# Patient Record
Sex: MALE | Race: WHITE
[De-identification: names, ages, dates, MRNs, and addresses within clinical notes are randomized per-mention and may not be internally consistent; named-entity substitution may affect disease eponyms.]

---

## 2021-11-22 ENCOUNTER — HOSPITAL ENCOUNTER (EMERGENCY)
Dept: HOSPITAL 11 - JP.ED | Age: 66
Discharge: HOME | End: 2021-11-22
Payer: COMMERCIAL

## 2021-11-22 DIAGNOSIS — I10: ICD-10-CM

## 2021-11-22 DIAGNOSIS — Z20.822: ICD-10-CM

## 2021-11-22 DIAGNOSIS — Z79.899: ICD-10-CM

## 2021-11-22 DIAGNOSIS — K81.9: Primary | ICD-10-CM

## 2021-11-22 LAB — SARS-COV-2 RNA RESP QL NAA+PROBE: NEGATIVE

## 2021-11-22 PROCEDURE — 81001 URINALYSIS AUTO W/SCOPE: CPT

## 2021-11-22 PROCEDURE — 74176 CT ABD & PELVIS W/O CONTRAST: CPT

## 2021-11-22 PROCEDURE — 99285 EMERGENCY DEPT VISIT HI MDM: CPT

## 2021-11-22 PROCEDURE — 83605 ASSAY OF LACTIC ACID: CPT

## 2021-11-22 PROCEDURE — 36415 COLL VENOUS BLD VENIPUNCTURE: CPT

## 2021-11-22 PROCEDURE — 85025 COMPLETE CBC W/AUTO DIFF WBC: CPT

## 2021-11-22 PROCEDURE — 80053 COMPREHEN METABOLIC PANEL: CPT

## 2021-11-22 PROCEDURE — 0241U: CPT

## 2021-11-22 NOTE — EDM.PDOC
ED HPI GENERAL MEDICAL PROBLEM





- General


Chief Complaint: General


Stated Complaint: FEVER,VOMITING,DIZZY


Time Seen by Provider: 11/22/21 11:17


Source of Information: Reports: Patient, RN Notes Reviewed


History Limitations: Reports: No Limitations





- History of Present Illness


INITIAL COMMENTS - FREE TEXT/NARRATIVE: 





66-year-old gentleman presents emergency department day complaint of nausea 

vomiting weakness, he states couple days last weekend and over the weekend he 

had pretty significant nausea and vomiting and now he feels just weak and 

rundown excessively thirsty.  He has been vaccinated with the booster no fevers 

no exposures that he is aware of the Covid 19


  ** Abdomen


Pain Score (Numeric/FACES): 4





- Related Data


                                    Allergies











Allergy/AdvReac Type Severity Reaction Status Date / Time


 


No Known Allergies Allergy   Verified 11/22/21 10:25











Home Meds: 


                                    Home Meds





Cyclobenzaprine [Flexeril] 10 mg PO TID PRN 11/20/19 [History]


amLODIPine Besylate [Norvasc] 10 mg PO DAILY 11/20/19 [History]


lisinopriL [Lisinopril] 10 mg PO DAILY 11/20/19 [History]











Past Medical History


Cardiovascular History: Reports: Hypertension


Musculoskeletal History: Reports: Fracture


Oncologic (Cancer) History: Reports: Other (See Below)


Other Oncologic History: Kidney CA





- Past Surgical History


GI Surgical History: Reports: Hernia, Inguinal


Other Male  Surgeries/Procedures: partial nephrectomy d/t cancer


Musculoskeletal Surgical History: Reports: Other (See Below)


Other Musculoskeletal Surgeries/Procedures:: toe surgery





Social & Family History





- Tobacco Use


Tobacco Use Status *Q: Never Tobacco User





- Caffeine Use


Caffeine Use: Reports: Coffee





- Alcohol Use


Days Per Week of Alcohol Use: 7


Number of Drinks Per Day: 2


Total Drinks Per Week: 14





- Recreational Drug Use


Recreational Drug Use: No





ED ROS GENERAL





- Review of Systems


Review Of Systems: See Below


Constitutional: Reports: Fever, Weakness, Fatigue


HEENT: Reports: No Symptoms


Respiratory: Reports: No Symptoms


Cardiovascular: Reports: No Symptoms


GI/Abdominal: Reports: Nausea, Vomiting.  Denies: Abdominal Pain





ED EXAM, GENERAL





- Physical Exam


Exam: See Below


Exam Limited By: No Limitations


General Appearance: Alert, WD/WN, No Apparent Distress


Respiratory/Chest: No Respiratory Distress, Lungs Clear, Normal Breath Sounds, 

No Accessory Muscle Use, Chest Non-Tender


Cardiovascular: Regular Rate, Rhythm, No Murmur


GI/Abdominal: Soft, Tender (RUQ)





Course





- Vital Signs


Last Recorded V/S: 


                                Last Vital Signs











Temp  97.9 F   11/22/21 10:24


 


Pulse  104 H  11/22/21 10:24


 


Resp  16   11/22/21 10:24


 


BP  138/78   11/22/21 10:24


 


Pulse Ox  93 L  11/22/21 10:24














- Orders/Labs/Meds


Orders: 


                               Active Orders 24 hr











 Category Date Time Status


 


 Peripheral IV Care [RC] .AS DIRECTED Care  11/22/21 11:20 Active


 


 UA W/MICROSCOPIC [URIN] Urgent Lab  11/22/21 13:25 Results


 


 Lactated Ringers [Ringers, Lactated] 1,000 ml Med  11/22/21 11:30 Active





 IV ASDIRECTED   


 


 Sodium Chloride 0.9% [Saline Flush] Med  11/22/21 11:19 Active





 10 ml FLUSH ASDIRECTED PRN   


 


 Isolation [COMM] Stat Oth  11/22/21 09:28 Ordered


 


 Peripheral IV Insertion Adult [OM.PC] Urgent Oth  11/22/21 11:19 Ordered








                                Medication Orders





Lactated Ringer's (Ringers, Lactated)  1,000 mls @ 999 mls/hr IV ASDIRECTED JEREL


   Last Admin: 11/22/21 11:44  Dose: 999 mls/hr


   Documented by: [id number]


Sodium Chloride (Sodium Chloride 0.9% 10 Ml Syringe)  10 ml FLUSH ASDIRECTED PRN


   PRN Reason: Keep Vein Open


   Last Admin: 11/22/21 11:44  Dose: 10 ml


   Documented by: [id number]








Labs: 


                                Laboratory Tests











  11/22/21 11/22/21 11/22/21 Range/Units





  09:28 11:43 11:43 


 


WBC   14.0 H   (4.5-11.0)  K/uL


 


RBC   5.53   (4.30-5.90)  M/uL


 


Hgb   16.0 H   (12.0-15.0)  g/dL


 


Hct   46.1   (40.0-54.0)  %


 


MCV   83   (80-98)  fL


 


MCH   29   (27-31)  pg


 


MCHC   35   (32-36)  %


 


Plt Count   278   (150-400)  K/uL


 


Add Manual Diff   Yes   


 


Neutrophils % (Manual)   77 H   (36-66)  %


 


Lymphocytes % (Manual)   9 L   (24-44)  %


 


Monocytes % (Manual)   14 H   (2-6)  %


 


Sodium    137 L  (140-148)  mmol/L


 


Potassium    3.6  (3.6-5.2)  mmol/L


 


Chloride    98 L  (100-108)  mmol/L


 


Carbon Dioxide    27  (21-32)  mmol/L


 


Anion Gap    15.6 H  (5.0-14.0)  mmol/L


 


BUN    54 H  (7-18)  mg/dL


 


Creatinine    2.0 H  (0.8-1.3)  mg/dL


 


Est Cr Clr Drug Dosing    40.96  mL/min


 


Estimated GFR (MDRD)    34 L  (>60)  


 


Glucose    121 H  ()  mg/dL


 


Lactic Acid     (0.4-2.0)  mmol/L


 


Calcium    8.7  (8.5-10.1)  mg/dL


 


Total Bilirubin    1.4 H  (0.2-1.0)  mg/dL


 


AST    82 H  (15-37)  U/L


 


ALT    190 H  (12-78)  U/L


 


Alkaline Phosphatase    582 H  ()  U/L


 


Total Protein    6.9  (6.4-8.2)  g/dL


 


Albumin    2.3 L  (3.4-5.0)  g/dL


 


Globulin    4.6 H  (2.3-3.5)  g/dL


 


Albumin/Globulin Ratio    0.5 L  (1.2-2.2)  


 


Urine Color     (YELLOW)  


 


Urine Appearance     (CLEAR)  


 


Urine pH     (5.0-8.0)  


 


Ur Specific Gravity     (1.008-1.030)  


 


Urine Protein     (NEGATIVE)  mg/dL


 


Urine Glucose (UA)     (NEGATIVE)  mg/dL


 


Urine Ketones     (NEGATIVE)  mg/dL


 


Urine Occult Blood     (NEGATIVE)  


 


Urine Nitrite     (NEGATIVE)  


 


Urine Bilirubin     (NEGATIVE)  


 


Urine Urobilinogen     (0.2-1.0)  EU/dL


 


Ur Leukocyte Esterase     (NEGATIVE)  


 


Urine RBC     (0-5)  


 


Urine WBC     (0-5)  


 


Ur Epithelial Cells     


 


Amorphous Sediment     


 


Urine Bacteria     


 


Urine Mucus     


 


Influenza Type A RNA  Negative    (NEGATIVE)  


 


RSV RNA (INAAT)  Negative    (NEGATIVE)  


 


Influenza Type B RNA  Negative    (NEGATIVE)  


 


SARS-CoV-2 RNA (ARTIS)  Negative    (NEGATIVE)  














  11/22/21 11/22/21 Range/Units





  11:43 13:25 


 


WBC    (4.5-11.0)  K/uL


 


RBC    (4.30-5.90)  M/uL


 


Hgb    (12.0-15.0)  g/dL


 


Hct    (40.0-54.0)  %


 


MCV    (80-98)  fL


 


MCH    (27-31)  pg


 


MCHC    (32-36)  %


 


Plt Count    (150-400)  K/uL


 


Add Manual Diff    


 


Neutrophils % (Manual)    (36-66)  %


 


Lymphocytes % (Manual)    (24-44)  %


 


Monocytes % (Manual)    (2-6)  %


 


Sodium    (140-148)  mmol/L


 


Potassium    (3.6-5.2)  mmol/L


 


Chloride    (100-108)  mmol/L


 


Carbon Dioxide    (21-32)  mmol/L


 


Anion Gap    (5.0-14.0)  mmol/L


 


BUN    (7-18)  mg/dL


 


Creatinine    (0.8-1.3)  mg/dL


 


Est Cr Clr Drug Dosing    mL/min


 


Estimated GFR (MDRD)    (>60)  


 


Glucose    ()  mg/dL


 


Lactic Acid  0.8   (0.4-2.0)  mmol/L


 


Calcium    (8.5-10.1)  mg/dL


 


Total Bilirubin    (0.2-1.0)  mg/dL


 


AST    (15-37)  U/L


 


ALT    (12-78)  U/L


 


Alkaline Phosphatase    ()  U/L


 


Total Protein    (6.4-8.2)  g/dL


 


Albumin    (3.4-5.0)  g/dL


 


Globulin    (2.3-3.5)  g/dL


 


Albumin/Globulin Ratio    (1.2-2.2)  


 


Urine Color   Yellow  (YELLOW)  


 


Urine Appearance   Slightly cloudy A  (CLEAR)  


 


Urine pH   5.5  (5.0-8.0)  


 


Ur Specific Gravity   1.020  (1.008-1.030)  


 


Urine Protein   100 H  (NEGATIVE)  mg/dL


 


Urine Glucose (UA)   Negative  (NEGATIVE)  mg/dL


 


Urine Ketones   Negative  (NEGATIVE)  mg/dL


 


Urine Occult Blood   Moderate H  (NEGATIVE)  


 


Urine Nitrite   Negative  (NEGATIVE)  


 


Urine Bilirubin   Small  (NEGATIVE)  


 


Urine Urobilinogen   1.0  (0.2-1.0)  EU/dL


 


Ur Leukocyte Esterase   Negative  (NEGATIVE)  


 


Urine RBC   5-10 H  (0-5)  


 


Urine WBC   0-5  (0-5)  


 


Ur Epithelial Cells   Not seen  


 


Amorphous Sediment   Many  


 


Urine Bacteria   Many  


 


Urine Mucus   Not seen  


 


Influenza Type A RNA    (NEGATIVE)  


 


RSV RNA (INAAT)    (NEGATIVE)  


 


Influenza Type B RNA    (NEGATIVE)  


 


SARS-CoV-2 RNA (ARTIS)    (NEGATIVE)  











Meds: 


Medications











Generic Name Dose Route Start Last Admin





  Trade Name Freq  PRN Reason Stop Dose Admin


 


Lactated Ringer's  1,000 mls @ 999 mls/hr  11/22/21 11:30  11/22/21 11:44





  Ringers, Lactated  IV   999 mls/hr





  ASDIRECTED JEREL   Administration


 


Sodium Chloride  10 ml  11/22/21 11:19  11/22/21 11:44





  Sodium Chloride 0.9% 10 Ml Syringe  FLUSH   10 ml





  ASDIRECTED PRN   Administration





  Keep Vein Open  














Discontinued Medications














Generic Name Dose Route Start Last Admin





  Trade Name Freq  PRN Reason Stop Dose Admin


 


Lactated Ringer's  1,000 mls @ 999 mls/hr  11/22/21 13:29  11/22/21 15:00





  Ringers, Lactated  IV  11/22/21 14:29  999 mls/hr





  BOLUS ONE   Administration














Departure





- Departure


Time of Disposition: 15:55


Disposition: Home, Self-Care 01


Condition: Fair


Clinical Impression: 


 Cholecystitis








- Discharge Information


Instructions:  Cholecystitis, Easy-to-Read


Referrals: 


PCP,None [Primary Care Provider] - 


Forms:  ED Department Discharge


Additional Instructions: 


Continue with your regular medications, use hydrocodone as needed for pain 

control, the surgery center will contact you for an appointment time call or 

return to the emergency department worsening of symptoms





Sepsis Event Note (ED)





- Evaluation


Sepsis Screening Result: No Definite Risk





- Focused Exam


Vital Signs: 


                                   Vital Signs











  Temp Pulse Resp BP Pulse Ox


 


 11/22/21 10:24  97.9 F  104 H  16  138/78  93 L














- My Orders


Last 24 Hours: 


My Active Orders





11/22/21 09:28


Isolation [COMM] Stat 





11/22/21 11:19


Sodium Chloride 0.9% [Saline Flush]   10 ml FLUSH ASDIRECTED PRN 


Peripheral IV Insertion Adult [OM.PC] Urgent 





11/22/21 11:20


Peripheral IV Care [RC] .AS DIRECTED 





11/22/21 11:30


Lactated Ringers [Ringers, Lactated] 1,000 ml IV ASDIRECTED 





11/22/21 13:25


UA W/MICROSCOPIC [URIN] Urgent 














- Assessment/Plan


Last 24 Hours: 


My Active Orders





11/22/21 09:28


Isolation [COMM] Stat 





11/22/21 11:19


Sodium Chloride 0.9% [Saline Flush]   10 ml FLUSH ASDIRECTED PRN 


Peripheral IV Insertion Adult [OM.PC] Urgent 





11/22/21 11:20


Peripheral IV Care [RC] .AS DIRECTED 





11/22/21 11:30


Lactated Ringers [Ringers, Lactated] 1,000 ml IV ASDIRECTED 





11/22/21 13:25


UA W/MICROSCOPIC [URIN] Urgent 











Plan: 





Assessment





Acuity = acute





Site and laterality = cholecystitis





Etiology  = unknown





Manifestations = abdominal pain





Location of injury =  Home





Lab values = WBC elevated 14.0 consistent leukocytosis, sodium low at 137 

consistent hyponatremia creatinine elevated 2.0 acute renal failure stage G3 

lactic acid normal 0.8, total bilirubin elevated 1.4 consistent 

hyperbilirubinemia AST at 82  consistent elevated liver enzymes alk phos 

elevated 582 urinalysis reveals 5-10 RBCs consistent with hematuria CT scan 

describes acute cholecystitis COVID-19 was negative, influenza a and B both 

negative RSV negative





Plan


Called and discussed case Dr. Louise surgeon on-call at 1530 kindly agreed 

to come and evaluate the patient in the emergency department, felt that he is a 

candidate for cholecystectomy however it does not need to be done immediately 

and they will try and coordinate surgery this week, the meantime prescription 

for hydrocodone 5/325 1 tab p.o. 3 times daily as needed total #10 provided for 

additional pain control as needed surgery center will contact him for an 

appointment time

















 This note was dictated using dragon voice recognition software please call with

 any questions on syntax or grammar.

## 2021-11-22 NOTE — CT
Abdomen Pelvis wo Cont

 

CLINICAL HISTORY: Right upper quadrant pain 

 

COMPARISON: None.

 

TECHNIQUE: Axial tomographic images are obtained from the dome of the diaphragm

to the pubic symphysis without IV contrast enhancement. The dosage reduction and

iterative reconstruction techniques employed.

 

FINDINGS: The lung bases show no mass or infiltrate. The liver is free of mass

or biliary dilatation. The gallbladder is distended and moderately thickened.

There is some wall calcification. There is moderate stranding in the surrounding

pericholecystic fat. There may be some fluid in the gallbladder fossa there are

some small stones in the dependent portion the gallbladder.. The spleen has a

normal size and shape. The pancreas shows no mass or inflammatory change. The

adrenal glands appear normal bilaterally. There are postsurgical changes in the

left kidney. There is some mild generalized pelvocaliectasis some of this may be

related to parapelvic cyst. There are scattered low-attenuation areas in the

lower poles of both kidneys which are likely cysts. The ureters have a normal

course and caliber. The bladder has a normal contour. The aorta has a normal

contour. There is no suspicious retroperitoneal adenopathy.

The small intestinal configuration is nonacute. There is gas and feces

throughout the colon. There is some diverticulosis without evidence of

diverticulitis. The appendix is been removed

 

 

IMPRESSION: Moderate gallbladder distention wall thickening and pericholecystic

inflammatory changes consistent with acute cholecystitis

 

There are multiple stones in the dependent portion of the gallbladder. There is

no biliary dilatation.

 

Multiple low-attenuation areas in the left kidney and postsurgical changes. This

should be correlated with prior studies. Ultrasound is also a consideration.

## 2021-11-22 NOTE — CONS
DATE OF SERVICE:  11/22/2021

 

REFERRING PHYSICIAN:

 

CONSULTING PHYSICIAN:  Олег Louise MD

 

REASON FOR CONSULTATION:  Right upper quadrant abdominal pain.

 

HISTORY OF PRESENT ILLNESS:  A pleasant 66-year-old male who had an acute onset of right

upper quadrant abdominal pain associated with some nausea.  This is a recurrent problem for

him.  May worsen by eating greasy or fatty foods.

 

PAST MEDICAL HISTORY:

1. Partial left nephrectomy secondary to malignancy.

2. Umbilical hernia repair.

 

SOCIAL HISTORY:  Not a smoker.

 

FAMILY HISTORY:  Noncontributory.

 

REVIEW OF SYSTEMS:  GENERAL:  Appropriate for condition.

HEENT:  No symptoms.

CARDIOVASCULAR:  No chest pain.

RESPIRATORY:  No shortness of breath.

GASTROINTESTINAL:  As above.

GENITOURINARY:  No dysuria.

NEUROLOGICAL:  No symptoms.

PSYCHIATRIC:  No anxiety.

 

The remainder review of systems reviewed and is negative.

 

PHYSICAL EXAMINATION:

VITAL SIGNS:  Stable.

HEENT:  Pupils are equal.

NECK:  Supple.

LUNGS:  Clear.

CARDIOVASCULAR:  Regular rhythm and rate.

RESPIRATORY:  Lungs are clear to consultation bilaterally.

ABDOMEN:  Pain with palpation right upper quadrant.

EXTREMITIES:  Full range of motion.

NEUROLOGIC:  Alert and oriented x3.

PSYCHIATRIC:  No gross depression.

 

IMAGING:  I did review a CT scan which shows cholelithiasis, cholecystitis.

 

ASSESSMENT/PLAN:  The patient will be scheduled for laparoscopic cholecystectomy with

possible open and bilateral TAP/rectus sheath blocks.  We discussed risks, benefits,

alternatives, and limitations including, but not limited to infection, bleeding,

perforation, false positives and false negatives were explained to the patient and they

wished to proceed.  We also discussed cystic duct leaks, common bile duct injuries, the

possibility of open surgery and other risks not listed here.  Patient understands these

risks and wishes to proceed.

 

 

 

 

Олег Louise MD

DD:  11/22/2021 15:27:26

DT:  11/22/2021 16:43:12

Job #:  [id number]

## 2021-11-26 ENCOUNTER — HOSPITAL ENCOUNTER (INPATIENT)
Dept: HOSPITAL 11 - JP.SDS | Age: 66
LOS: 9 days | Discharge: HOME | DRG: 263 | End: 2021-12-05
Attending: SURGERY | Admitting: SURGERY
Payer: COMMERCIAL

## 2021-11-26 DIAGNOSIS — Z79.899: ICD-10-CM

## 2021-11-26 DIAGNOSIS — K82.A1: ICD-10-CM

## 2021-11-26 DIAGNOSIS — Z88.5: ICD-10-CM

## 2021-11-26 DIAGNOSIS — K80.12: Primary | ICD-10-CM

## 2021-11-26 DIAGNOSIS — K65.9: ICD-10-CM

## 2021-11-26 DIAGNOSIS — Z85.528: ICD-10-CM

## 2021-11-26 DIAGNOSIS — K91.872: ICD-10-CM

## 2021-11-26 DIAGNOSIS — G47.33: ICD-10-CM

## 2021-11-26 DIAGNOSIS — Z87.891: ICD-10-CM

## 2021-11-26 DIAGNOSIS — J44.9: ICD-10-CM

## 2021-11-26 DIAGNOSIS — I10: ICD-10-CM

## 2021-11-26 PROCEDURE — 0FT44ZZ RESECTION OF GALLBLADDER, PERCUTANEOUS ENDOSCOPIC APPROACH: ICD-10-PCS | Performed by: SURGERY

## 2021-11-26 PROCEDURE — P9047 ALBUMIN (HUMAN), 25%, 50ML: HCPCS

## 2021-11-26 RX ADMIN — HYDROCODONE BITARTRATE AND ACETAMINOPHEN PRN TAB: 5; 325 TABLET ORAL at 19:19

## 2021-11-26 RX ADMIN — TAZOBACTAM SODIUM AND PIPERACILLIN SODIUM SCH MLS/HR: 375; 3 INJECTION, SOLUTION INTRAVENOUS at 14:41

## 2021-11-26 RX ADMIN — FENTANYL CITRATE PRN MCG: 50 INJECTION, SOLUTION INTRAMUSCULAR; INTRAVENOUS at 13:45

## 2021-11-26 RX ADMIN — HYDROCODONE BITARTRATE AND ACETAMINOPHEN PRN TAB: 5; 325 TABLET ORAL at 14:42

## 2021-11-26 RX ADMIN — TAZOBACTAM SODIUM AND PIPERACILLIN SODIUM SCH MLS/HR: 375; 3 INJECTION, SOLUTION INTRAVENOUS at 20:09

## 2021-11-27 RX ADMIN — TAZOBACTAM SODIUM AND PIPERACILLIN SODIUM SCH MLS/HR: 375; 3 INJECTION, SOLUTION INTRAVENOUS at 08:54

## 2021-11-27 RX ADMIN — TAZOBACTAM SODIUM AND PIPERACILLIN SODIUM SCH MLS/HR: 375; 3 INJECTION, SOLUTION INTRAVENOUS at 02:49

## 2021-11-27 RX ADMIN — TAZOBACTAM SODIUM AND PIPERACILLIN SODIUM SCH MLS/HR: 375; 3 INJECTION, SOLUTION INTRAVENOUS at 20:16

## 2021-11-27 RX ADMIN — HYDROCODONE BITARTRATE AND ACETAMINOPHEN PRN TAB: 5; 325 TABLET ORAL at 00:15

## 2021-11-27 RX ADMIN — HYDROCODONE BITARTRATE AND ACETAMINOPHEN PRN TAB: 5; 325 TABLET ORAL at 10:33

## 2021-11-27 RX ADMIN — HYDROCODONE BITARTRATE AND ACETAMINOPHEN PRN TAB: 5; 325 TABLET ORAL at 05:56

## 2021-11-27 RX ADMIN — TAZOBACTAM SODIUM AND PIPERACILLIN SODIUM SCH MLS/HR: 375; 3 INJECTION, SOLUTION INTRAVENOUS at 14:16

## 2021-11-28 RX ADMIN — TAZOBACTAM SODIUM AND PIPERACILLIN SODIUM SCH MLS/HR: 375; 3 INJECTION, SOLUTION INTRAVENOUS at 19:59

## 2021-11-28 RX ADMIN — HYDROCODONE BITARTRATE AND ACETAMINOPHEN PRN TAB: 5; 325 TABLET ORAL at 12:55

## 2021-11-28 RX ADMIN — HYDROCODONE BITARTRATE AND ACETAMINOPHEN PRN TAB: 5; 325 TABLET ORAL at 20:07

## 2021-11-28 RX ADMIN — TAZOBACTAM SODIUM AND PIPERACILLIN SODIUM SCH MLS/HR: 375; 3 INJECTION, SOLUTION INTRAVENOUS at 07:41

## 2021-11-28 RX ADMIN — TAZOBACTAM SODIUM AND PIPERACILLIN SODIUM SCH MLS/HR: 375; 3 INJECTION, SOLUTION INTRAVENOUS at 02:44

## 2021-11-28 RX ADMIN — TAZOBACTAM SODIUM AND PIPERACILLIN SODIUM SCH MLS/HR: 375; 3 INJECTION, SOLUTION INTRAVENOUS at 15:59

## 2021-11-29 PROCEDURE — 0W9G3ZZ DRAINAGE OF PERITONEAL CAVITY, PERCUTANEOUS APPROACH: ICD-10-PCS | Performed by: SURGERY

## 2021-11-29 RX ADMIN — HYDROCODONE BITARTRATE AND ACETAMINOPHEN PRN TAB: 5; 325 TABLET ORAL at 15:23

## 2021-11-29 RX ADMIN — HYDROCODONE BITARTRATE AND ACETAMINOPHEN PRN TAB: 5; 325 TABLET ORAL at 20:16

## 2021-11-29 RX ADMIN — FENTANYL CITRATE PRN MCG: 50 INJECTION, SOLUTION INTRAMUSCULAR; INTRAVENOUS at 08:24

## 2021-11-29 RX ADMIN — SODIUM CHLORIDE SCH MLS/HR: 9 INJECTION, SOLUTION INTRAVENOUS at 13:10

## 2021-11-29 RX ADMIN — HYDROCODONE BITARTRATE AND ACETAMINOPHEN PRN TAB: 5; 325 TABLET ORAL at 00:15

## 2021-11-29 RX ADMIN — HYDROCODONE BITARTRATE AND ACETAMINOPHEN PRN TAB: 5; 325 TABLET ORAL at 04:41

## 2021-11-29 NOTE — CRLCT
For Patients:  As a result of the 21st Century Cures Act, medical imaging 

exams and procedure reports are released immediately into your electronic 

medical record.  You may view this report before your referring provider.  

If you have questions, please contact your health care provider.



Indication:



Status post laparoscopic cholecystectomy, increased abdominal pain and 

heart rate



Technique:



Volumetric multidetector CT images of the abdomen and pelvis were obtained 

after the administration of intravenous contrast.



100 cc Isovue-300 low osmolar intravenous contrast



Comparison:



CT abdomen and pelvis November 22, 2021



Findings:



There are right greater than left basilar effusions with adjacent 

compressive atelectasis.



The liver demonstrates a moderately heterogeneous appearance of the 

anteroinferior right liver which may represent minimal edema and/or hepatic 

parenchymal inflammation.



The portal vein is patent.



The gallbladder is surgically absent with demonstration of a large air and 

fluid collection within the gallbladder fossa with a trace amount of 

perihepatic fluid. There is fluid tracking along the right pericolic 

gutter. An underlying bile leak is difficult to exclude. There is no 

evidence of rim enhancement.



There is no significant common biliary ductal dilatation or abrupt cut off.



The spleen is normal in enhancement and size.



There is mild thickening of the gastric antrum which can be seen in the 

setting of chronic gastritis changes. Small duodenal diverticula are noted.



The pancreas is normal in enhancement without significant atrophy.



The adrenal glands are unremarkable.



The kidneys demonstrate simple cystic changes bilaterally.



There is moderate stool seen throughout the colon with distal colonic 

diverticulosis without evidence of diverticulitis.



There is a mildly prominent appendix in the right lower quadrant which 

measures up to 1.1 centimeters with suggestion of periappendiceal 

inflammatory changes.



There is no significant mesenteric, retroperitoneal, or pelvic sidewall 

lymph nodes.



The aorta is nonaneurysmal.  There is no significant atherosclerotic 

disease appreciated.



The solid pelvic viscera are grossly unremarkable.



There is no free fluid or free air.



There is minimal subcutaneous emphysema seen within the anterior abdominal 

wall soft tissues along the right lower quadrant tracking into the central 

pelvis and partially visualized base of the penis.



The lumbar vertebral body heights are grossly maintained with moderate 

multilevel degenerative disc disease.



Impression:



Interval cholecystectomy with demonstration of a large air and fluid 

collection within the gallbladder fossa which may represent postoperative 

changes, a bile leak is difficult to exclude. There is no rim enhancement. 

Correlate with history of clinical symptoms and follow-up with 

hepatobiliary imaging exam if there remains persisting clinical concern. 



There is moderate inflammatory change and ill-defined hypo enhancement 

along the gallbladder fossa within the liver which may represent underlying 

hepatic parenchymal injury. 



Incidental note is made of a moderately prominent appendix in the right 

lower quadrant pain with suggestion of trace periappendiceal fluid 

measuring up to 1.0 centimeters. Correlate with right lower quadrant 

tenderness if there remains per persistent clinical concern as this finding 

may be equivocal and incidental with minimal inflammatory changes from the 

prior surgery tracking along the pericolic gutter. 



Minimal right basilar pleural effusion with adjacent compressive 

atelectasis.



Please note that all CT scans at this facility use dose modulation, 

iterative reconstruction, and/or weight-based dosing when appropriate to 

reduce radiation dose to as low as reasonably achievable.



Dictated by Leobardo Nieves MD @ 11/29/2021 6:18:14 AM



(Electronically Signed)

## 2021-11-29 NOTE — OR
DATE OF PROCEDURE:  11/29/2021

 

SURGEON:  Олег Louise MD

 

PROCEDURE:  Drainage of seroma in liver bed causing peritonitis (37108).

 

COMPLICATIONS:  None.

 

ASSISTANT:  None.

 

ANESTHESIA:  General.

 

RISKS:  Risks, benefits, alternatives, and limitations including, but not limited to

infection, bleeding, perforation, false positives, false negatives were explained to the

patient and he wished to proceed.

 

PROCEDURE IN DETAIL:  The patient was placed in supine position.  The previous incisions

were utilized.  A Veress needle was used to enter the abdomen without abnormality.  A drop

test performed without abnormality.  Abdomen was subsequently insufflated.

 

In the proximity of the liver bed and slightly on the right gutter, a fluid collection was

noted.  This was tan in color, however, there was a __________ would be nondiagnostic as

that oxidizes brown.  This fluid was then cultured.  It was irrigated with 1 L of irrigation

including an additional 500 mL of meropenem.  The Santana-Casarez drain was in the middle of

the fluid collection but was replaced as this did not have any output significantly at the

completion of postoperative phase.  The remainder of the liver fossa and gallbladder area

was inspected and no abnormalities were noted.  The pelvis was inspected for fluid and none

was noted.  The wounds were closed with 3-0 Vicryl and 4-0 Vicryl in interrupted running

fashion.  The peritonitis was noted to be moderate.  No gross stool.  No abnormal bleeding.

The patient tolerated the procedure well.

 

 

 

 

Олег Louise MD

DD:  11/29/2021 11:29:22

DT:  11/29/2021 13:03:23

Job #:  [id number]

## 2021-11-30 RX ADMIN — SODIUM CHLORIDE SCH MLS/HR: 9 INJECTION, SOLUTION INTRAVENOUS at 14:29

## 2021-11-30 RX ADMIN — HYDROCODONE BITARTRATE AND ACETAMINOPHEN PRN TAB: 5; 325 TABLET ORAL at 16:06

## 2021-11-30 RX ADMIN — HYDROCODONE BITARTRATE AND ACETAMINOPHEN PRN TAB: 5; 325 TABLET ORAL at 04:54

## 2021-11-30 RX ADMIN — HYDROCODONE BITARTRATE AND ACETAMINOPHEN PRN TAB: 5; 325 TABLET ORAL at 21:39

## 2021-11-30 RX ADMIN — SODIUM CHLORIDE SCH MLS/HR: 9 INJECTION, SOLUTION INTRAVENOUS at 00:23

## 2021-11-30 NOTE — PCM.CONS
H&P History of Present Illness





- General


Date of Service: 11/30/21


Admit Problem/Dx: 


                           Admission Diagnosis/Problem





Admission Diagnosis/Problem      Cholecystitis








Source of Information: Patient, Provider


History Limitations: Reports: No Limitations





- History of Present Illness


Initial Comments - Free Text/Narative: 





HPI: Seth was admitted 11/26 for a laparoscopic cholecystectomy.  During the 

procedure he was noted to have a necrotic and abscessed gallbladder.  This was 

carefully removed.  Over the next couple of days he clinically appeared to be 

improving but did have intermittent low-grade fevers as well as a rising white 

blood cell count.  He went back to the operating room yesterday for washout 

after a CT scan showed a possible bile leak and fluid collection in the gallbl

adder fossa.  The exploratory laparotomy revealed a probable seroma which was 

washed out.  Patient has been on broad-spectrum antibiotics and these were 

adjusted yesterday after the surgery.  Despite this his white blood cell count 

continues to climb and is now more than 30,000.  I was asked to see him today 

regarding his elevated white count and to help assess for evidence of additional

infection.  The patient reports that he is feeling better each day.  He noted a 

significant improvement in his abdominal pain after surgery yesterday.  He has 

not had a fever in more than 24 hours.  No complaints of headache, nausea or 

shortness of breath.  He has been up and walking around.  Tolerating his diet 

well and overall feels fairly well.


  ** Abdomen


Pain Score (Numeric/FACES): 7





- Related Data


Allergies/Adverse Reactions: 


                                    Allergies











Allergy/AdvReac Type Severity Reaction Status Date / Time


 


No Known Allergies Allergy   Verified 11/26/21 06:17











Home Medications: 


                                    Home Meds





Cyclobenzaprine [Flexeril] 10 mg PO TID PRN 11/20/19 [History]


amLODIPine Besylate [Norvasc] 10 mg PO DAILY 11/20/19 [History]


lisinopriL [Lisinopril] 10 mg PO DAILY 11/20/19 [History]


Ibuprofen 600 mg PO DAILY 11/23/21 [History]


diphenhydrAMINE HCL [Diphenhydramine HCl] 12.5 mg PO BEDTIME PRN 11/23/21 

[History]


Acetaminophen/HYDROcodone [HYDROcodone-Acetaminophen 5-325 MG *] 1 tab PO Q4H 

PRN 11/26/21 [History]











Past Medical History


HEENT History: Reports: Cataract


Cardiovascular History: Reports: Hypertension


Respiratory History: Reports: Sleep Apnea


Musculoskeletal History: Reports: Fracture


Neurological History: Reports: Other (See Below)


Other Neuro History: degenerative disc disease


Oncologic (Cancer) History: Reports: Other (See Below)


Other Oncologic History: Kidney CA





- Infectious Disease History


Infectious Disease History: Reports: Measles





- Past Surgical History


HEENT Surgical History: Reports: Cataract Surgery, Eye Surgery, Laser Surgery


Other HEENT Surgeries/Procedures: bilateral torn retinas


GI Surgical History: Reports: Hernia, Inguinal


Other Male  Surgeries/Procedures: partial nephrectomy d/t cancer


Musculoskeletal Surgical History: Reports: Other (See Below)


Other Musculoskeletal Surgeries/Procedures:: toe surgery





Social & Family History





- Family History


Cardiac: Reports: High Cholesterol, Hypertension, MI, Stent


GI: Reports: Cholelithiasis


: Reports: Renal Calculus


Neurological: Reports: CVA


Hematologic: Reports: Other (See Below)


Other Hematologic Family History: mom had some blood disorder-unknown to patient

 what type


Oncologic: Reports: Liver, Skin





- Tobacco Use


Tobacco Use Status *Q: Never Tobacco User


Second Hand Smoke Exposure: No





- Caffeine Use


Caffeine Use: Reports: Coffee





- Alcohol Use


Alcohol Use History: No





- Recreational Drug Use


Recreational Drug Use: No





H&P Review of Systems





- Review of Systems:


Review Of Systems: See Below


Free Text/Narrative: 





A complete 12 point review of systems was obtained.  Pertinent positives and 

negatives are noted in the history of present illness.  All other systems were 

reviewed and were negative except as noted.





Exam





- Exam


Exam: See Below





- Vital Signs


Vital Signs: 


                                Last Vital Signs











Temp  35.7 C L  11/30/21 07:16


 


Pulse  80   11/30/21 07:16


 


Resp  16   11/30/21 07:16


 


BP  151/82 H  11/30/21 07:16


 


Pulse Ox  93 L  11/30/21 07:16











Weight: 95.708 kg





- Exam


Quality Assessment: No: Supplemental Oxygen


General: Alert, Oriented, Cooperative.  No: Mild Distress


HEENT: Conjunctiva Clear, Mucosa Moist & Pink


Neck: Supple, Trachea Midline


Lungs: Clear to Auscultation, Normal Respiratory Effort


Cardiovascular: Regular Rate, Regular Rhythm.  No: Systolic Murmur


GI/Abdominal Exam: Normal Bowel Sounds, Soft, No Distention


Back Exam: Normal Inspection, Full Range of Motion


Extremities: No Pedal Edema.  No: Increased Warmth


Peripheral Pulses: 2+: Dorsalis Pedis (L), Dorsalis Pedis (R)


Skin: Warm, Dry.  No: Rash


Neuro Extensive - Mental Status: Alert, Oriented x3, Nl Response to Commands


Neuro Extensive - Motor, Sensory, Reflexes: No: Dysarthria, Abnormal Motor, 

Tremor


Psychiatric: Alert, Normal Affect





- Patient Data


Lab Results Last 24 hrs: 


                         Laboratory Results - last 24 hr











  11/30/21 11/30/21 11/30/21 Range/Units





  05:45 05:45 06:24 


 


WBC  37.4 H*    (4.5-11.0)  K/uL


 


RBC  3.38 L    (4.30-5.90)  M/uL


 


Hgb  9.5 L    (12.0-15.0)  g/dL


 


Hct  29.0 L    (40.0-54.0)  %


 


MCV  86    (80-98)  fL


 


MCH  28    (27-31)  pg


 


MCHC  33    (32-36)  %


 


Plt Count  624 H    (150-400)  K/uL


 


Add Manual Diff  Yes    


 


Neutrophils % (Manual)  85 H    (36-66)  %


 


Band Neutrophils %  5    (5-11)  %


 


Lymphocytes % (Manual)  6 L    (24-44)  %


 


Monocytes % (Manual)  4    (2-6)  %


 


Sodium   138 L   (140-148)  mmol/L


 


Potassium   4.2   (3.6-5.2)  mmol/L


 


Chloride   105   (100-108)  mmol/L


 


Carbon Dioxide   25   (21-32)  mmol/L


 


Anion Gap   12.2   (5.0-14.0)  mmol/L


 


BUN   19 H   (7-18)  mg/dL


 


Creatinine   1.1   (0.8-1.3)  mg/dL


 


Est Cr Clr Drug Dosing   76.67   mL/min


 


Estimated GFR (MDRD)   > 60   (>60)  


 


Glucose   128 H   ()  mg/dL


 


Calcium   7.8 L   (8.5-10.1)  mg/dL


 


Total Bilirubin   1.4 H   (0.2-1.0)  mg/dL


 


AST   37   (15-37)  U/L


 


ALT   102 H   (12-78)  U/L


 


Alkaline Phosphatase   293 H   ()  U/L


 


Total Protein   5.8 L   (6.4-8.2)  g/dL


 


Albumin   1.5 L   (3.4-5.0)  g/dL


 


Globulin   4.3 H   (2.3-3.5)  g/dL


 


Albumin/Globulin Ratio   0.4 L   (1.2-2.2)  


 


Urine Color    Yellow  (YELLOW)  


 


Urine Appearance    Clear  (CLEAR)  


 


Urine pH    5.5  (5.0-8.0)  


 


Ur Specific Gravity    >= 1.030  (1.008-1.030)  


 


Urine Protein    100 H  (NEGATIVE)  mg/dL


 


Urine Glucose (UA)    Negative  (NEGATIVE)  mg/dL


 


Urine Ketones    Negative  (NEGATIVE)  mg/dL


 


Urine Occult Blood    Trace-intact H  (NEGATIVE)  


 


Urine Nitrite    Negative  (NEGATIVE)  


 


Urine Bilirubin    Small H  (NEGATIVE)  


 


Urine Urobilinogen    0.2  (0.2-1.0)  EU/dL


 


Ur Leukocyte Esterase    Negative  (NEGATIVE)  


 


Urine RBC    0-5  (0-5)  


 


Urine WBC    0-5  (0-5)  


 


Ur Epithelial Cells    Rare  


 


Amorphous Sediment    Few  


 


Urine Bacteria    Moderate  


 


Urine Mucus    Rare  











Result Diagrams: 


                                 11/30/21 05:45





                                 11/30/21 05:45


Danny Results Last 24 hrs: 


                                  Microbiology











 11/28/21 09:40 Aerobic Blood Culture - Preliminary





 Blood - Venous - Lab Draw    NO GROWTH AFTER 2 DAYS





 Anaerobic Blood Culture - Preliminary





    NO GROWTH AFTER 2 DAYS


 


 11/28/21 09:40 Aerobic Blood Culture - Preliminary





 Blood - Venous    NO GROWTH AFTER 2 DAYS





 Anaerobic Blood Culture - Preliminary





    NO GROWTH AFTER 2 DAYS


 


 11/29/21 11:48 Gram Stain - Final





 Abdominal Fluid - Aspirate Wound Culture - Preliminary





    NO GROWTH AFTER 1 DAY





 Anaerobic Culture - Preliminary





    NO GROWTH AFTER 1 DAY


 


 11/26/21 08:28 Gram Stain - Final





 Gallbladder Wound Culture - Final





    Escherichia Coli





 Anaerobic Culture - Final





    NO GROWTH AFTER 3 DAYS











Imaging Impressions Last 24 hrs: 


CT scan of the abdomen and pelvis-this was completed 11/29.  There was evidence 

for a fluid collection in the gallbladder fossa that could have been a seroma 

versus possible bile leak.  The appendix appeared slightly enlarged though there

 was not significant inflammation surrounding the appendix.





Sepsis Event Note





- Evaluation


Sepsis Screening Result: No Definite Risk





- Focused Exam


Vital Signs: 


                                   Vital Signs











  Temp Pulse Resp BP Pulse Ox


 


 11/30/21 07:16  35.7 C L  80  16  151/82 H  93 L


 


 11/30/21 02:45  35.4 C L  82  16  136/82  93 L














Consult PN Assessment/Plan


POD#: 4


Procedures: 


Procedures





ASSAY OF LACTIC ACID (11/22/21)


COMPLETE CBC W/AUTO DIFF WBC (11/22/21)


COMPREHEN METABOLIC PANEL (11/22/21)


CT ABD & PELVIS W/O CONTRAST (11/22/21)


EMERGENCY DEPT VISIT (11/22/21)


MANUAL THERAPY 1/> [facility] (03/31/20)


MRI JOINT UPR EXTREM W/O DYE (08/27/19)


NJX INTERLAMINAR LMBR/SAC (12/04/19)


PT EVAL LOW COMPLEX 20 MIN (02/27/20)


PT EVAL MOD COMPLEX 30 MIN (08/20/19)


ROUTINE VENIPUNCTURE (11/22/21)


SELF CARE MNGMENT TRAINING (02/27/20)


THERAPEUTIC EXERCISES (03/31/20)


URINALYSIS AUTO W/SCOPE (11/22/21)


US EXAM ABDO BACK WALL COMP (07/23/19)


X-RAY EXAM CHEST 2 VIEWS (07/23/19)








Problem List Initiated/Reviewed/Updated: Yes


Plan: 





ASSESSMENT AND RECOMMENDATIONS - 





Elevated white blood cell count-rising white blood cell count despite what 

appears to be adequate antibiotic therapy with the only culture positive finding

 being an E. coli.  Chest x-ray is clear.  Urinalysis is clear.  Examination is 

benign.  Pain is improving.  No fevers in 24 hours.  This could all be related 

to recent stress/inflammation.  If this is the case I would anticipate 

improvement over the next 24 hours.  No obvious source for residual infection.  

Most likely source would be intra-abdominal but he has minimal to no pain and no

 other findings were noted on the CT yesterday or the exploratory laparotomy.  


-Reassess white blood cell count in the morning and continue to monitor for 

other sites of infection 


-I would anticipate improvement in the white blood cell count over the next 24 

to 48 hours


-I would not recommend any changes to the level of care at this time





Acute on chronic cholecystitis-complicated by gangrenous gallbladder and 

abscess.  Status post laparoscopic cholecystectomy and repeat washout yesterday.

  Clinically he seems to be doing very well.  Hepatic panel numbers are 

improving.  Pain is well controlled.


-Postoperative care as per surgical team





Thank you for the interesting consultation.  I will continue to follow along 

with the patient.  Please contact me with any specific questions or concerns.





Arcenio Fuentes MD


Requesting Provider: Dr. Louise


Date Consult Requested: 11/30/21


Reason for Consult: Elevated white blood cell count


Patient History Reviewed: Yes


Admission H&P Reviewed: No (Not available)


Notified Requestor: Yes


Time Spent (in minutes): 45

## 2021-11-30 NOTE — CR
CHEST: 2 view

 

CLINICAL HISTORY:Elevated white count

 

COMPARISON:2019

 

FINDINGS:  Heart size and pulmonary vascularity are normal. There are

atherosclerotic changes in the aorta.. There is minimal streaky density in the

left lower lung which likely represent some scarring or atelectasis.. There is a

small right pleural effusion. There is a surgical drain in the right upper

quadrant.

 

Impression: Streaky postop atelectasis left lower lung

 

Small right pleural effusion

## 2021-12-01 RX ADMIN — HYDROCODONE BITARTRATE AND ACETAMINOPHEN PRN TAB: 5; 325 TABLET ORAL at 01:25

## 2021-12-01 RX ADMIN — HYDROCODONE BITARTRATE AND ACETAMINOPHEN PRN TAB: 5; 325 TABLET ORAL at 10:53

## 2021-12-01 RX ADMIN — SODIUM CHLORIDE SCH MLS/HR: 9 INJECTION, SOLUTION INTRAVENOUS at 01:21

## 2021-12-01 RX ADMIN — HYDROCODONE BITARTRATE AND ACETAMINOPHEN PRN TAB: 5; 325 TABLET ORAL at 20:42

## 2021-12-01 RX ADMIN — HYDROCODONE BITARTRATE AND ACETAMINOPHEN PRN TAB: 5; 325 TABLET ORAL at 14:45

## 2021-12-01 RX ADMIN — SODIUM CHLORIDE SCH MLS/HR: 9 INJECTION, SOLUTION INTRAVENOUS at 14:34

## 2021-12-01 RX ADMIN — HYDROCODONE BITARTRATE AND ACETAMINOPHEN PRN TAB: 5; 325 TABLET ORAL at 05:26

## 2021-12-01 NOTE — PCM.CONSN
- General Info


Date of Service: 12/01/21


Subjective Update: 





No acute events overnight.  Patient continues to feel well.  Minimal abdominal 

pain that is well controlled.  No nausea.  No fevers.  White blood cell count is

starting to trend down.  Up and walking around without difficulty.


Functional Status: Reports: Pain Controlled, Tolerating Diet





- Review of Systems


General: Denies: Fever


Gastrointestinal: Reports: Abdominal Pain





- Patient Data


Vitals - Most Recent: 


                                Last Vital Signs











Temp  35.7 C L  12/01/21 07:26


 


Pulse  103 H  12/01/21 07:26


 


Resp  16   12/01/21 07:26


 


BP  158/86 H  12/01/21 07:26


 


Pulse Ox  97   12/01/21 07:26











Weight - Most Recent: 95.708 kg


I&O - Last 24 Hours: 


                                 Intake & Output











 11/30/21 12/01/21 12/01/21





 22:59 06:59 14:59


 


Intake Total 1560 1250 600


 


Output Total 110 120 


 


Balance 1450 1130 600











Lab Results Last 24 Hours: 


                         Laboratory Results - last 24 hr











  12/01/21 12/01/21 Range/Units





  05:13 05:13 


 


WBC  28.9 H   (4.5-11.0)  K/uL


 


RBC  3.66 L   (4.30-5.90)  M/uL


 


Hgb  10.3 L   (12.0-15.0)  g/dL


 


Hct  31.8 L   (40.0-54.0)  %


 


MCV  87   (80-98)  fL


 


MCH  28   (27-31)  pg


 


MCHC  32   (32-36)  %


 


Plt Count  819 H   (150-400)  K/uL


 


Add Manual Diff  Yes   


 


Neutrophils % (Manual)  79 H   (36-66)  %


 


Band Neutrophils %  2 L   (5-11)  %


 


Lymphocytes % (Manual)  9 L   (24-44)  %


 


Monocytes % (Manual)  7 H   (2-6)  %


 


Eosinophils % (Manual)  3   (2-4)  %


 


Sodium   139 L  (140-148)  mmol/L


 


Potassium   4.0  (3.6-5.2)  mmol/L


 


Chloride   106  (100-108)  mmol/L


 


Carbon Dioxide   26  (21-32)  mmol/L


 


Anion Gap   11.0  (5.0-14.0)  mmol/L


 


BUN   23 H  (7-18)  mg/dL


 


Creatinine   1.3  (0.8-1.3)  mg/dL


 


Est Cr Clr Drug Dosing   64.88  mL/min


 


Estimated GFR (MDRD)   56 L  (>60)  


 


Glucose   101  ()  mg/dL


 


Calcium   8.5  (8.5-10.1)  mg/dL


 


Total Bilirubin   1.2 H  (0.2-1.0)  mg/dL


 


AST   51 H  (15-37)  U/L


 


ALT   115 H  (12-78)  U/L


 


Alkaline Phosphatase   314 H  ()  U/L


 


Total Protein   6.1 L  (6.4-8.2)  g/dL


 


Albumin   1.7 L  (3.4-5.0)  g/dL


 


Globulin   4.4 H  (2.3-3.5)  g/dL


 


Albumin/Globulin Ratio   0.4 L  (1.2-2.2)  











Danny Results Last 24 Hours: 


                                  Microbiology











 11/28/21 09:40 Aerobic Blood Culture - Preliminary





 Blood - Venous - Lab Draw    NO GROWTH AFTER 3 DAYS





 Anaerobic Blood Culture - Preliminary





    NO GROWTH AFTER 3 DAYS


 


 11/28/21 09:40 Aerobic Blood Culture - Preliminary





 Blood - Venous    NO GROWTH AFTER 3 DAYS





 Anaerobic Blood Culture - Preliminary





    NO GROWTH AFTER 3 DAYS


 


 11/29/21 11:48 Gram Stain - Final





 Abdominal Fluid - Aspirate Wound Culture - Preliminary





    NO GROWTH AFTER 2 DAYS





 Anaerobic Culture - Preliminary





    NO GROWTH AFTER 2 DAYS











Med Orders - Current: 


                               Current Medications





Acetaminophen (Acetaminophen 325 Mg Tab)  650 mg PO Q6H PRN


   PRN Reason: Pain


Hydrocodone Bitart/Acetaminophen (Acetaminophen/Hydrocodone 325-5 Mg Tab)  1 - 2

tab PO Q4H PRN


   PRN Reason: Pain


   Last Admin: 12/01/21 10:53 Dose:  1 tab


   Documented by: 


Amlodipine Besylate (Amlodipine 5 Mg Tab)  10 mg PO BEDTIME [facility]ER[facility]


Benzocaine/Menthol (Benzocaine/Cetylpyridinium/Menthol Lozenge)  1 lozenge 

MUCMEM Q4H PRN


   PRN Reason: Sore Throat


Calcium Carbonate/Glycine (Calcium Carbonate 500 Mg Tab.Chew)  1,000 mg PO Q2H 

PRN


   PRN Reason: Indigestion


   Last Admin: 12/01/21 01:21 Dose:  1,000 mg


   Documented by: 


Docusate Sodium (Docusate Sodium 100 Mg Cap)  100 mg PO BID PRN


   PRN Reason: Constipation


Enoxaparin Sodium (Enoxaparin 40 Mg/0.4 Ml Syringe)  40 mg SUBCUT Q24H [facility]


   Last Admin: 12/01/21 10:55 Dose:  40 mg


   Documented by: 


Fentanyl (Fentanyl 100 Mcg/2 Ml Sdv)  10 mcg IVPUSH Q1H PRN


   PRN Reason: Pain (mild 1-3)


Fentanyl (Fentanyl 100 Mcg/2 Ml Sdv)  20 mcg IVPUSH Q1H PRN


   PRN Reason: Pain (moderate 4-6)


   Last Admin: 11/26/21 11:10 Dose:  20 mcg


   Documented by: 


Fentanyl (Fentanyl 100 Mcg/2 Ml Sdv)  30 mcg IVPUSH Q1H PRN


   PRN Reason: Pain (severe 7-10)


   Last Admin: 11/29/21 08:24 Dose:  30 mcg


   Documented by: 


Hydroxyzine HCl (Hydroxyzine Hcl 100 Mg/2 Ml Sdv)  100 mg IM Q4H PRN


   PRN Reason: Breakthrough Pain


Sodium Chloride (Normal Saline)  1,000 mls @ 125 mls/hr IV ASDIRECTED [facility]


   Last Admin: 11/30/21 10:11 Dose:  125 mls/hr


   Documented by: 


Meropenem 1 gm/ Sodium (Chloride)  100 mls @ 200 mls/hr IV Q8H [facility]


   Last Admin: 12/01/21 07:19 Dose:  200 mls/hr


   Documented by: 


Vancomycin HCl 1.4 gm/ Sodium (Chloride)  250 mls @ 167 mls/hr IV Q12H [facility]


   Last Admin: 12/01/21 01:21 Dose:  167 mls/hr


   Documented by: 


Lisinopril (Lisinopril 10 Mg Tab)  10 mg PO BEDTIME [facility]


Ondansetron HCl (Ondansetron 4 Mg/2 Ml Sdv)  4 mg IVPUSH Q4H PRN


   PRN Reason: Nausea/Vomiting


Zolpidem Tartrate (Zolpidem 5 Mg Tab)  5 mg PO BEDTIME PRN


   PRN Reason: Sleep


   Last Admin: 11/26/21 20:48 Dose:  5 mg


   Documented by: 





Discontinued Medications





Bupivacaine HCl (Bupivacaine 0.5% 50 Ml Mdv) Confirm Administered Dose 50 ml 

.ROUTE .STK-MED ONE


   Stop: 11/26/21 06:53


Bupivacaine HCl/Epinephrine Bitart (Bupivacaine 0.5%/Epinephrine 1:200,000 50 Ml

Mdv) Confirm Administered Dose 50 ml .ROUTE .STK-MED ONE


   Stop: 11/29/21 10:14


   Last Admin: 11/29/21 11:24 Dose:  20 ml


   Documented by: 


Ropivacaine 49 ml/Dexamethasone 8 mg/Epinephrine HCl 0.4 mg/ Sodium Chloride 

28.6 ml  0 ml NERVRT ASDIRECTED [facility]


   Last Admin: 11/26/21 08:14 Dose:  80 syringe


   Documented by: 


Ropivacaine 47 ml/Dexamethasone 8 mg/Epinephrine HCl 0.4 mg/ Sodium Chloride 

30.6 ml  0 ml NERVRT ASDIRECTED [facility]


   Last Admin: 11/29/21 11:16 Dose:  80 syringe


   Documented by: 


Dexamethasone (Dexamethasone 4 Mg/Ml Sdv) Confirm Administered Dose 4 mg .ROUTE 

.STK-MED ONE


   Stop: 11/26/21 07:30


Dexamethasone (Dexamethasone 4 Mg/Ml Sdv) Confirm Administered Dose 4 mg .ROUTE 

.STK-MED ONE


   Stop: 11/29/21 09:23


Fentanyl (Fentanyl 250 Mcg/5 Ml Sdv) Confirm Administered Dose 250 mcg .ROUTE 

.STK-MED ONE


   Stop: 11/26/21 07:30


Fentanyl (Fentanyl 100 Mcg/2 Ml Sdv) Confirm Administered Dose 100 mcg .ROUTE 

.STK-MED ONE


   Stop: 11/26/21 09:42


Fentanyl (Fentanyl 250 Mcg/5 Ml Sdv) Confirm Administered Dose 250 mcg .ROUTE 

.STK-MED ONE


   Stop: 11/29/21 09:23


Glycopyrrolate (Glycopyrrolate 0.2 Mg/Ml 5 Ml Mdv) Confirm Administered Dose 1 

mg .ROUTE .STK-MED ONE


   Stop: 11/26/21 07:30


Glycopyrrolate (Glycopyrrolate 0.2 Mg/Ml 5 Ml Mdv) Confirm Administered Dose 1 

mg .ROUTE .STK-MED ONE


   Stop: 11/29/21 09:23


Cefazolin Sodium/Dextrose 2 gm (/ Premix)  50 mls @ 100 mls/hr IV ONETIME ONE


   Stop: 11/26/21 07:59


   Last Admin: 11/26/21 07:38 Dose:  100 mls/hr


   Documented by: 


Metronidazole 500 mg/ Premix  100 mls @ 100 mls/hr IV ONETIME ONE


   Stop: 11/26/21 08:29


   Last Admin: 11/26/21 07:38 Dose:  100 mls/hr


   Documented by: 


Sodium Chloride (Normal Saline)  1,000 mls @ 75 mls/hr IV ASDIRECTED [facility]


   Last Admin: 11/26/21 14:41 Dose:  75 mls/hr


   Documented by: 


Lactated Ringer's (Ringers, Lactated) Confirm Administered Dose 1,000 mls @ as 

directed .ROUTE .STK-MED ONE


   Stop: 11/26/21 08:40


Piperacillin/Tazobactam/ (Dextrose 3.375 gm/ Premix)  50 mls @ 100 mls/hr IV Q6H

[facility]


   Last Admin: 11/28/21 19:59 Dose:  100 mls/hr


   Documented by: 


Vancomycin HCl 1.9 gm/ Sodium (Chloride)  500 mls @ 250 mls/hr IV ONETIME ONE


   Stop: 11/28/21 13:59


   Last Admin: 11/28/21 12:27 Dose:  250 mls/hr


   Documented by: 


Vancomycin HCl 1.4 gm/ Sodium (Chloride)  250 mls @ 166.667 mls/hr IV Q12H [facility]


   Last Admin: 11/29/21 00:13 Dose:  166.667 mls/hr


   Documented by: 


Sodium Chloride (Normal Saline)  84 mls @ 3.5 mls/sec IV ASDIRECTED [facility]


   Stop: 11/29/21 04:50


   Last Admin: 11/29/21 05:06 Dose:  3.5 mls/sec


   Documented by: 


Lactated Ringer's (Ringers, Lactated) Confirm Administered Dose 1,000 mls @ as 

directed .ROUTE .STK-MED ONE


   Stop: 11/29/21 10:45


Sodium Chloride (Normal Saline) Confirm Administered Dose 10 mls @ as directed 

.ROUTE .STK-MED ONE


   Stop: 11/29/21 11:13


Vancomycin HCl 1.4 gm/ Sodium (Chloride)  250 mls @ 166.667 mls/hr IV Q12H [facility]


   Last Admin: 11/30/21 00:23 Dose:  166.667 mls/hr


   Documented by: 


Ibuprofen (Ibuprofen 800 Mg Tab)  800 mg PO Q8H [facility]


   Last Admin: 11/28/21 03:18 Dose:  800 mg


   Documented by: 


Iopamidol (Iopamidol 612 Mg/Ml 50 Ml Sdv)  30 ml PO ASDIRECTED ONE


   Stop: 11/29/21 03:31


   Last Admin: 11/29/21 03:31 Dose:  30 ml


   Documented by: 


Iopamidol (Iopamidol 612 Mg/Ml 50 Ml Sdv) Confirm Administered Dose 50 ml .ROUTE

.STK-MED ONE


   Stop: 11/29/21 00:13


   Last Admin: 11/29/21 00:21 Dose:  Not Given


   Documented by: 


Iopamidol (Iopamidol 612 Mg/Ml 100 Ml Bottle)  100 ml IV .AS DIRECTED [facility]


   Stop: 11/29/21 04:50


   Last Admin: 11/29/21 05:05 Dose:  100 ml


   Documented by: 


Lactated Ringer's (Lactated Ringers 1,000 Ml Bag)  2,000 ml IRR .STK-MED ONE


   Stop: 11/29/21 11:01


   Last Admin: 11/29/21 11:00 Dose:  2,000 ml


   Documented by: 


Lidocaine/Epinephrine (Lidocaine 1% With Epinephrine 1:100,000 50 Ml Mdv) 

Confirm Administered Dose 50 ml .ROUTE .STK-MED ONE


   Stop: 11/26/21 06:53


Meropenem (Meropenem 500 Mg Sdv) Confirm Administered Dose 500 mg .ROUTE .STK-

MED ONE


   Stop: 11/29/21 11:13


   Last Admin: 11/29/21 11:17 Dose:  500 mg


   Documented by: 


Neostigmine Methylsulfate (Neostigmine Methylsulfate 1 Mg/Ml 5 Ml Syringe) 

Confirm Administered Dose 5 mg .ROUTE .STK-MED ONE


   Stop: 11/26/21 07:30


Neostigmine Methylsulfate (Neostigmine Methylsulfate 1 Mg/Ml 5 Ml Syringe) 

Confirm Administered Dose 5 mg .ROUTE .STK-MED ONE


   Stop: 11/29/21 09:23


Ondansetron HCl (Ondansetron 4 Mg/2 Ml Sdv) Confirm Administered Dose 4 mg 

.ROUTE .STK-MED ONE


   Stop: 11/26/21 07:30


Ondansetron HCl (Ondansetron 4 Mg/2 Ml Sdv) Confirm Administered Dose 4 mg 

.ROUTE .STK-MED ONE


   Stop: 11/29/21 09:23


Propofol (Propofol 200 Mg/20 Ml Sdv) Confirm Administered Dose 200 mg .ROUTE 

.STK-MED ONE


   Stop: 11/26/21 07:30


Propofol (Propofol 200 Mg/20 Ml Sdv) Confirm Administered Dose 200 mg .ROUTE .S

TK-MED ONE


   Stop: 11/29/21 09:23


Rocuronium Bromide (Rocuronium 50 Mg/5 Ml Vial) Confirm Administered Dose 50 mg 

.ROUTE .STK-MED ONE


   Stop: 11/26/21 07:30


Rocuronium Bromide (Rocuronium 50 Mg/5 Ml Vial) Confirm Administered Dose 50 mg 

.ROUTE .STK-MED ONE


   Stop: 11/29/21 09:23


Scopolamine (Scopolamine 1.5 Mg Transdermal Patch)  1.5 mg TOP Q72H [facility]


   Stop: 11/29/21 07:00


   Last Admin: 11/26/21 13:40 Dose:  Not Given


   Documented by: 


Succinylcholine Chloride (Succinylcholine 200 Mg/10 Ml Mdv) Confirm Administered

Dose 200 mg .ROUTE .STK-MED ONE


   Stop: 11/29/21 09:23


Vancomycin HCl (Vancomycin 1 Gm Sdv)  0 gm IV .PHARMACY TO DOSE [facility]


   Stop: 11/28/21 12:30











- Exam


Quality Assessment: No: Supplemental Oxygen


General: Alert, Oriented, Cooperative, No Acute Distress


Lungs: Normal Respiratory Effort


GI/Abdominal Exam: Soft, No Distention


Psy/Mental Status: Alert, Normal Affect





Sepsis Event Note





- Evaluation


Sepsis Screening Result: No Definite Risk





- Focused Exam


Vital Signs: 


                                   Vital Signs











  Temp Pulse Resp BP Pulse Ox


 


 12/01/21 07:26  35.7 C L  103 H  16  158/86 H  97


 


 12/01/21 03:00  36.3 C  89  22 H  157/90 H  94 L














Consult PN Assessment/Plan


Procedures: 


Procedures





ASSAY OF LACTIC ACID (11/22/21)


COMPLETE CBC W/AUTO DIFF WBC (11/22/21)


COMPREHEN METABOLIC PANEL (11/22/21)


CT ABD & PELVIS W/O CONTRAST (11/22/21)


EMERGENCY DEPT VISIT (11/22/21)


MANUAL THERAPY 1/> [facility] (03/31/20)


MRI JOINT UPR EXTREM W/O DYE (08/27/19)


NJX INTERLAMINAR LMBR/SAC (12/04/19)


PT EVAL LOW COMPLEX 20 MIN (02/27/20)


PT EVAL MOD COMPLEX 30 MIN (08/20/19)


ROUTINE VENIPUNCTURE (11/22/21)


SELF CARE MNGMENT TRAINING (02/27/20)


THERAPEUTIC EXERCISES (03/31/20)


URINALYSIS AUTO W/SCOPE (11/22/21)


US EXAM ABDO BACK WALL COMP (07/23/19)


X-RAY EXAM CHEST 2 VIEWS (07/23/19)








Problem List Initiated/Reviewed/Updated: Yes


Plan: 





ASSESSMENT AND RECOMMENDATIONS - 





Elevated white blood cell count-white blood cell count finally trending down.  

No new positive culture results.  Patient continues to do well clinically.  

Hopefully the white blood cell count will continue to trend down.


-Reassess white blood cell count in the morning and continue to monitor for 

other sites of infection 


-I would anticipate ongoing improvement in the white blood cell count over the 

next 24 to 48 hours


-I would not recommend any changes to the level of care at this time





Acute on chronic cholecystitis-complicated by gangrenous gallbladder and 

abscess.  Status post laparoscopic cholecystectomy and repeat washout yesterday.

 Clinically he seems to be doing very well.  Hepatic panel numbers are 

improving.  Pain is well controlled.


-Postoperative care as per surgical team





Arcenio Fuentes MD

## 2021-12-02 RX ADMIN — HYDROCODONE BITARTRATE AND ACETAMINOPHEN PRN TAB: 5; 325 TABLET ORAL at 17:42

## 2021-12-02 RX ADMIN — SODIUM CHLORIDE SCH MLS/HR: 9 INJECTION, SOLUTION INTRAVENOUS at 01:18

## 2021-12-02 RX ADMIN — HYDROCODONE BITARTRATE AND ACETAMINOPHEN PRN TAB: 5; 325 TABLET ORAL at 22:28

## 2021-12-02 RX ADMIN — SODIUM CHLORIDE SCH MLS/HR: 9 INJECTION, SOLUTION INTRAVENOUS at 14:42

## 2021-12-02 RX ADMIN — HYDROCODONE BITARTRATE AND ACETAMINOPHEN PRN TAB: 5; 325 TABLET ORAL at 01:17

## 2021-12-02 RX ADMIN — HYDROCODONE BITARTRATE AND ACETAMINOPHEN PRN TAB: 5; 325 TABLET ORAL at 12:57

## 2021-12-03 RX ADMIN — HYDROCODONE BITARTRATE AND ACETAMINOPHEN PRN TAB: 5; 325 TABLET ORAL at 20:08

## 2021-12-03 RX ADMIN — ALBUMIN HUMAN SCH MLS/HR: 250 SOLUTION INTRAVENOUS at 10:35

## 2021-12-03 RX ADMIN — SODIUM CHLORIDE SCH MLS/HR: 9 INJECTION, SOLUTION INTRAVENOUS at 01:55

## 2021-12-03 RX ADMIN — ALBUMIN HUMAN SCH MLS/HR: 250 SOLUTION INTRAVENOUS at 17:01

## 2021-12-04 RX ADMIN — AMOXICILLIN AND CLAVULANATE POTASSIUM SCH TAB: 875; 125 TABLET, FILM COATED ORAL at 23:20

## 2021-12-04 RX ADMIN — ALBUMIN HUMAN SCH MLS/HR: 250 SOLUTION INTRAVENOUS at 17:19

## 2021-12-04 RX ADMIN — ALBUMIN HUMAN SCH MLS/HR: 250 SOLUTION INTRAVENOUS at 10:55

## 2021-12-04 RX ADMIN — HYDROCODONE BITARTRATE AND ACETAMINOPHEN PRN TAB: 5; 325 TABLET ORAL at 20:58

## 2021-12-04 RX ADMIN — HYDROCODONE BITARTRATE AND ACETAMINOPHEN PRN TAB: 5; 325 TABLET ORAL at 01:57

## 2021-12-05 RX ADMIN — HYDROCODONE BITARTRATE AND ACETAMINOPHEN PRN TAB: 5; 325 TABLET ORAL at 00:54

## 2021-12-05 RX ADMIN — AMOXICILLIN AND CLAVULANATE POTASSIUM SCH TAB: 875; 125 TABLET, FILM COATED ORAL at 08:28

## 2021-12-17 NOTE — OR
DATE OF PROCEDURE:  11/26/2021

 

SURGEON:  Олег Louise MD

 

PROCEDURE PERFORMED:  Laparoscopic cholecystectomy.

 

COMPLICATIONS:  None.

 

ASSISTANT:  None.

 

FINDINGS:  Severe inflammation of the gallbladder and its associated fossa with some

purulent material noted between the gallbladder and the fossa.

 

ANESTHESIA:  MAC.

 

RISKS:  Risks, benefits, alternatives, and limitations including, but not limited to

infection, bleeding, perforation, false positives, false negatives, along with cystic duct

leaks, common bile duct injuries, and other risks not listed here were explained to the

patient who then wished to proceed.

 

PROCEDURE IN DETAIL:  The patient was placed in the supine position.  A supraumbilical

curvilinear incision was made.  A Veress needle was used to enter the abdomen without

abnormality.  A drop test was performed without abnormality.  The abdomen was subsequently

insufflated.

 

This was followed by an additional 10 and two 5 mm trocars.  The gallbladder was noted to be

completely enveloped with omentum.  There was significant amount of inflammation noted with

this.

 

The omentum was __________ removed.  The gallbladder itself was very friable.  Using blunt

dissection, a "clear view" of the gallbladder was obtained with a single pulsatile structure

into the gallbladder and a single non-pulsatile structure into the gallbladder.  This

dissection, which typically takes approximately 10 minutes, took approximately 45 minutes.

 

The duct and the artery were subsequently clipped and transected.  The remaining 1/3rd was

removed off the gallbladder bed.  The gallbladder would be cultured.

 

The abdomen was irrigated with 3 L of irrigation.  A 10 flat Santana-Casarez drain was placed.

Surgicel was placed at the liver fossa due to the petechia-type hemorrhage despite

significant electrocautery and other interventions.

 

The air was removed.  The wounds were closed with 3-0 Vicryl and 4-0 Vicryl, except for

superior port which was enlarged due to the size of the gallbladder and closed with 2-0

Vicryl suture.

 

The ports were irrigated and closed as described above.  The patient tolerated the procedure

well.

 

 

 

 

Олег Louise MD

DD:  12/16/2021 08:55:07

DT:  12/16/2021 14:46:28

Job #:  [id number]

## 2022-12-21 NOTE — PN
DATE OF SERVICE:  11/27/2021

 

SUBJECTIVE:  The patient is doing well.  His pain is controlled.  No nausea, vomiting,

shortness of breath, or chest pain.

 

OBJECTIVE:  VITAL SIGNS:  Stable.  His temperature is 96.9, blood pressure 143/83.  He is

saturating at 91% on room air.

CARDIOVASCULAR:  Regular rate.

RESPIRATORY:  Lungs are clear to auscultation bilaterally.  Incisions are intact.

 

ASSESSMENT AND PLAN:  Status post laparoscopic cholecystectomy.

 

PLAN:

1. Gastrointestinal:  The patient does have an elevated bilirubin this morning along with

    additional LFTs.  This is not surprising considering the significant complicated

    gallbladder surgery he just underwent.  We will recheck his bilirubin in the morning.

    His drain output does not show any bilious output, rather a serosanguineous type

    output.  The LFTs are undoubtedly due to the removal of gallbladder which the patient

    had an abscess/necrosis of the wall of the gallbladder which contacted the liver bed.

2. Chronic obstructive pulmonary disease.  He is on low-level IV fluids making adequate

    urine, we will remove his Sinha.  RT is going to see him today with aggressive

    incentive spirometry.

3. Prophylaxis.  The patient and I discussed the importance of early ambulation.  We will

    also start him on Lovenox subcu daily today.

4. GI.  He is on advanced diet as tolerated.

5. Pain management.  We will add ibuprofen today.  Pain appears to be tolerable.  We will

    shift away from narcotics.

 

 

 

 

Олег Louise MD

DD:  11/27/2021 10:56:30

DT:  11/27/2021 11:27:34

Job #:  [id number]
DATE OF SERVICE:  11/28/2021

 

SUBJECTIVE:  The patient continues to improve.  Subjectively, he feels much better today.

 

OBJECTIVE:  VITAL SIGNS:  Stable.  He is afebrile.

CARDIOVASCULAR:  Regular rhythm and rate.

RESPIRATORY:  Lungs are clear to auscultation bilaterally.

SKIN:  Incisions healing well.

 

ASSESSMENT:  Status post laparoscopic cholecystectomy.

 

PLAN:

1. Diet.  The patient is doing well.  He has not had a bowel movement yet.  We will give

    him some stool softeners to assist with this today.

2. Cultures.  The patient was noted to have a necrotic/abscess gallbladder prior or

    intraoperatively.  The cultures from that area grew E. coli which he is sensitive to.

    His white blood cell count is slightly elevated.  We will add vancomycin today and if

    this continues to be refractory, we will consider __________.

3. Bilirubin.  Bilirubin elevated slightly today.  However, this appears to be plateauing

    as this has only increased by 0.3 today.

4. Creatinine.  This has also improved.  The patient states he is drinking a large amount

    of fluid.  His urine is light colored.  Therefore, he appears to be well hydrated at

    this time, but we will keep an eye on him.

5. Infectious Disease.  We will order blood cultures today in addition to adding

    antibiotics.  Subjectively, the patient looks good.  He is afebrile.  The drain output

    is serosanguineous and has decreased in total volume.  However, the concern would be

    that this is refractory to this.  However, please see the first part of this plan for

    further details.

 

 

 

 

Олег Louise MD

DD:  11/28/2021 09:51:36

DT:  11/28/2021 10:52:14

Job #:  [id number]
DATE OF SERVICE:  11/30/2021

 

SUBJECTIVE:  The patient continues to do better today.  He subjectively feels significantly

better per his report.  No nausea, vomiting, shortness of breath, or chest pain.  Passing

gas.

 

OBJECTIVE:  VITAL SIGNS:  Stable.

CARDIOVASCULAR:  Regular rhythm and rate.

RESPIRATORY:  Lungs are clear to auscultation bilaterally.

SKIN:  Incisions healing well.

 

ASSESSMENT:  Status post laparoscopic cholecystectomy.

 

PLAN:  We will have Internal Medicine see the patient today to evaluate his elevated white

blood cell count, although he is afebrile and significantly improved.  Continue same

antibiotic plan.

 

 

 

 

Олег Louise MD

DD:  11/30/2021 07:57:36

DT:  11/30/2021 08:22:09

Job #:  [id number]
DATE OF SERVICE:  12/01/2021

 

SUBJECTIVE:  The patient continues to improve.  His white blood cell count has decreased

significantly overnight.  No nausea, vomiting, shortness of breath, or chest pain.  The

patient is having bowel movements.

 

OBJECTIVE:  VITAL SIGNS:  Stable.

CARDIOVASCULAR:  Regular rhythm and rate.

RESPIRATORY:  Lungs clear to auscultation bilaterally.

ABDOMEN:  Incisions healing well, nontender, nondistended.

 

ASSESSMENT:  Status post laparoscopic cholecystectomy.

 

PLAN:  We will start his home blood pressure medications today.  I would continue the same

antibiotic regimen.  Anticipate discharge probably in the next 24 to 48 hours based upon

white blood cell counts.

 

 

 

 

Олег Louise MD

DD:  12/01/2021 08:20:04

DT:  12/01/2021 08:48:32

Job #:  [id number]
DATE OF SERVICE:  12/03/2021

 

SUBJECTIVE:  Seth is on IV antibiotics.  He is afebrile.  White count this morning is 24.1,

albumin is 1.7, platelets 925.  Microbiology showed E coli.

 

REVIEW OF SYSTEMS:  Negative for any pertinent positives or negatives.

 

OBJECTIVE:  GENERAL:  Seth is a 66-year-old male.  He is alert and orientated.

VITAL SIGNS:  TPR is 97, 95, 16.  Blood pressure is 158/103.

HEENT:  Negative.

NECK:  Supple.

HEART:  Regular rate and rhythm.

LUNGS:  Clear.

ABDOMEN:  Dressings dry and intact.

EXTREMITIES:  Without peripheral edema.

 

ASSESSMENT:  Drainage of seroma in liver bed causing peritonitis.  Date of procedure:

11/29/2021.  Surgeon:  Олег Louise MD

 

PLAN:

1. Discontinue vancomycin.

2. Continue meropenem.

3. Add Azactam 1 g q.8 hours IV.

4. Albumin 50 g IV daily x2 days.

5. Continue routine already ordered labs, but add magnesium and phosphorus in a.m.

6. We will evaluate p.r.n. or in a.m.

 

 

 

 

Nata Hillman PA-C

DD:  12/03/2021 08:22:35

DT:  12/03/2021 09:18:03

Job #:  [id number]
DATE OF SERVICE:  12/04/2021

 

The patient has been afebrile with stable vital signs.  He is __________ hypertensive with

somewhat high blood pressure.  He does have some underlying cardiovascular diagnoses and

continues to be on 2 antihypertensives, so was given a dose of Lasix this morning.  His

hemoglobin is down to 8.8 which again is probably related to fluid shifting without any

bleeding with hemoglobin 10.7 yesterday.  His albumin is somewhat low, we gave him another

50 g of albumin today.  Otherwise, continue the adjusted antibiotics with meropenem and

Azactam for today and his magnesium is somewhat low level.  It will be supplemented over the

next 24 hours as well.  His white count is down to 16,000 and if he continues to improve to

an extent, we may be able to discharge him home tomorrow.  We will send him home on 2 oral

antibiotics.  At this point, most likely __________ Augmentin.

 

 

 

 

Jarrett Oneal MD

DD:  12/04/2021 07:17:15

DT:  12/07/2021 12:29:45

Job #:  [id number]
DATE OF SERVICE:  12/05/2021

 

The patient has now been afebrile with vital signs over several days.  A little bit

hypertensive yesterday, did receive a single dose of Lasix which has probably improved the

blood pressures, and his IV came out, and he will be discharged home at this point.  White

count is down to 13,000 and clinically looks good.  LIUDMILA drain is serosanguineous.  That will

be removed.  He will be sent home on [place] 5/325 one tablet q.4 hours p.r.n. pain, #18,

along with ibuprofen 600 mg p.o. q.i.d. p.r.n.  for antibiotics, he will be sent home on

Augmentin 875 mg p.o. b.i.d. x7 days and Levaquin 500 mg daily x7 days.  Both of these

antibiotics were effective against the cultured E coli on the sensitivity panel.  His

magnesium was noted to be somewhat low, and he will be sent home also on magnesium oxide 400

mg p.o. daily x60 days.  He will be following up with  __________ in [facility]

on 12/09/2021.

 

 

 

 

Jarrett Oneal MD

DD:  12/05/2021 07:30:22

DT:  12/07/2021 11:18:04

Job #:  [id number]
anxiety